# Patient Record
Sex: MALE | Race: BLACK OR AFRICAN AMERICAN | NOT HISPANIC OR LATINO | URBAN - METROPOLITAN AREA
[De-identification: names, ages, dates, MRNs, and addresses within clinical notes are randomized per-mention and may not be internally consistent; named-entity substitution may affect disease eponyms.]

---

## 2021-07-08 ENCOUNTER — EMERGENCY (EMERGENCY)
Facility: HOSPITAL | Age: 26
LOS: 1 days | Discharge: HOME | End: 2021-07-08
Admitting: EMERGENCY MEDICINE
Payer: SELF-PAY

## 2021-07-08 VITALS
OXYGEN SATURATION: 98 % | DIASTOLIC BLOOD PRESSURE: 86 MMHG | HEART RATE: 107 BPM | SYSTOLIC BLOOD PRESSURE: 133 MMHG | WEIGHT: 139.99 LBS | HEIGHT: 65 IN | RESPIRATION RATE: 18 BRPM | TEMPERATURE: 98 F

## 2021-07-08 VITALS
DIASTOLIC BLOOD PRESSURE: 78 MMHG | SYSTOLIC BLOOD PRESSURE: 116 MMHG | TEMPERATURE: 98 F | RESPIRATION RATE: 18 BRPM | OXYGEN SATURATION: 98 % | HEART RATE: 90 BPM

## 2021-07-08 DIAGNOSIS — Z00.00 ENCOUNTER FOR GENERAL ADULT MEDICAL EXAMINATION WITHOUT ABNORMAL FINDINGS: ICD-10-CM

## 2021-07-08 DIAGNOSIS — E86.0 DEHYDRATION: ICD-10-CM

## 2021-07-08 DIAGNOSIS — R53.1 WEAKNESS: ICD-10-CM

## 2021-07-08 DIAGNOSIS — Z59.0 HOMELESSNESS: ICD-10-CM

## 2021-07-08 PROCEDURE — 99284 EMERGENCY DEPT VISIT MOD MDM: CPT

## 2021-07-08 SDOH — ECONOMIC STABILITY - HOUSING INSECURITY: HOMELESSNESS: Z59.0

## 2021-07-08 NOTE — ED PROVIDER NOTE - PATIENT PORTAL LINK FT
You can access the FollowMyHealth Patient Portal offered by [facility] by registering at the following website: [email or web address]. By joining [employer]’s FollowMyHealth portal, you will also be able to view your health information using other applications (apps) compatible with our system.

## 2021-07-08 NOTE — ED PROVIDER NOTE - CLINICAL SUMMARY MEDICAL DECISION MAKING FREE TEXT BOX
Homeless Patient requesting food. Will give food and discharge. Patient has no medical complaints at this time. Stable for discharge.

## 2021-07-08 NOTE — ED ADULT TRIAGE NOTE - CHIEF COMPLAINT QUOTE
Pt BIBA from street. C/o feeling "weak and dehydrated". Ambulatory int triage with steady gait. A&Ox4.

## 2021-07-08 NOTE — ED PROVIDER NOTE - OBJECTIVE STATEMENT
26 y/o homeless male, BIBA from street, c/o feeling "weak and dehydrated". Patient is requesting food. No other medical complaints at this time. Denies fever, chills, chest pain, SOB, abdominal pain, headache, or dizziness.

## 2023-12-14 NOTE — ED PROVIDER NOTE - CROS ED NEURO ALL NEG
"Chief Complaint  [facility] and Hip Pain        Chris PAIGE Thomas Jr. presents to [facility] INTERNAL MEDICINE        Subjective      45 year old male patient presents to [facility]. Switching from [facility].    With complaints of right hip pain today. Has been seeing Dr. Rishi Olmedo for hip pain, has been receiving large joint arthrocentesis - has had three injections. Has been with brief relief but nothing long term.  Next appt is not until January. Is in need of total hip replacement but opted for injections first. Severe degenerative changes right hip and avascular necrosis. He is with difficulty walking and recurrent pain.  Takes ibuprofen 800 mg TID. Pain is sharp and radiating down the leg.     Hip pain pain is affecting ability to maintain erection.reports pain is sharp and distracting.  Reports a history of ED in past and low testosterone level as well. Was on Androgel and sildenafil treatment in the past.    He is obese.  Weight is 255 with a BMI of 39.93.  He has been seeing a dietitian and has been making dietary changes but unable to exercise due to hip pain.     BP elevated today 143/90. He is without headache, visual changes, CP.                     Objective         Vital Signs:     /90 (BP Location: Right arm, Patient Position: Sitting, Cuff Size: Adult)   Pulse 76   Temp 98.1 °F (36.7 °C) (Infrared)   Ht 170.2 cm (67.01\")   Wt 116 kg (255 lb)   SpO2 98%   BMI 39.93 kg/m²       Physical Exam  Vitals reviewed.   Constitutional:       Appearance: He is well-developed. He is obese.      Comments:      HENT:      Head: Normocephalic and atraumatic.      Mouth/Throat:      Mouth: Mucous membranes are moist.      Pharynx: Oropharynx is clear.   Eyes:      Conjunctiva/sclera: Conjunctivae normal.      Pupils: Pupils are equal, round, and reactive to light.   Cardiovascular:      Rate and Rhythm: Normal rate and regular rhythm.      Pulses: Normal pulses.      " Heart sounds: Normal heart sounds.   Pulmonary:      Effort: Pulmonary effort is normal. No respiratory distress.      Breath sounds: Normal breath sounds. No stridor. No wheezing, rhonchi or rales.   Chest:      Chest wall: No tenderness.   Musculoskeletal:      Cervical back: Normal range of motion.      Right hip: Crepitus present. Decreased range of motion.   Skin:     General: Skin is warm and dry.      Findings: No rash.   Neurological:      Mental Status: He is alert and oriented to person, place, and time.   Psychiatric:         Behavior: Behavior normal.                History of Present Illness      Patient Active Problem List   Diagnosis    Essential hypertension    Chronic back pain    Obesity (BMI 30-39.9)    Erectile dysfunction    Vitamin D deficiency    Environmental allergies    Primary osteoarthritis of left knee    Dyslipidemia    Elevated TSH    Avascular necrosis of bone of hip, right    Class 2 obesity without serious comorbidity with body mass index (BMI) of 39.0 to 39.9 in adult    Right hip pain         Past Medical History:   Diagnosis Date    Environmental allergies     tested in youth, no prior immunotherapy    Erectile dysfunction     Fracture, clavicle 1984    Broken collar bone as a child    Hip arthrosis 10/2022    This is the reason for my visit    Hypertension     Low back pain 1/1/1998    Back injury.  Sporadic pain from excessive activity    Low back strain 1995    Work injury - [employer]    Obesity 1/1/2000    SARAH (obstructive sleep apnea)     resolved after uvulectomy and T & A in 2005    Osgood-Schlatter's disease 1994    Never officially diagnosed. But was very symptomatic    Primary osteoarthritis of left knee     Vitamin D deficiency           Family History   Problem Relation Age of Onset    Colon cancer Father         dx'd at 62    Hypertension Father     Cancer Father         Colon cancer    Hyperlipidemia Father     Alcohol abuse Maternal Uncle     Stroke Maternal Grandfather      Hypertension Mother     Hyperlipidemia Mother     Hypertension Sister     Diabetes Maternal Grandmother     Arthritis Maternal Grandmother     Stroke Paternal Grandfather     Heart disease Paternal Grandfather     No Known Problems Son     COPD Paternal Grandmother     Lung disease Neg Hx           Past Surgical History:   Procedure Laterality Date    ADENOIDECTOMY  2004    TONSILLECTOMY AND ADENOIDECTOMY  2005    Dr. Martinez, done for sleep apnea    UVULECTOMY  2005    for SARAH          Social History     Socioeconomic History    Marital status:    Tobacco Use    Smoking status: Never    Smokeless tobacco: Never    Tobacco comments:     Never smoked or used tobacco of any kind   Vaping Use    Vaping Use: Never used   Substance and Sexual Activity    Alcohol use: Not Currently     Comment: rare occassion    Drug use: No    Sexual activity: Yes     Partners: Female     Birth control/protection: Coitus interruptus, None     Comment: Galina Castañeda                    Result Review :                                                  Assessment and Plan      Diagnoses and all orders for this visit:    1. Avascular necrosis of bone of hip, right (Primary)  -     predniSONE (DELTASONE) 20 MG tablet; Take 1 tablet by mouth 2 (Two) Times a Day for 2 days, THEN 1 tablet Daily for 3 days, THEN 0.5 tablets Daily for 4 days.  Dispense: 9 tablet; Refill: 0  -     traMADol-acetaminophen (Ultracet) 37.5-325 MG per tablet; Take 1 tablet by mouth Every 8 (Eight) Hours As Needed for Moderate Pain.  Dispense: 60 tablet; Refill: 0  -     ibuprofen (ADVIL,MOTRIN) 800 MG tablet; Take 1 tablet by mouth Every 8 (Eight) Hours As Needed for Mild Pain.  Dispense: 90 tablet; Refill: 1    2. Right hip pain  -     predniSONE (DELTASONE) 20 MG tablet; Take 1 tablet by mouth 2 (Two) Times a Day for 2 days, THEN 1 tablet Daily for 3 days, THEN 0.5 tablets Daily for 4 days.  Dispense: 9 tablet; Refill: 0  -     traMADol-acetaminophen (Ultracet)  37.5-325 MG per tablet; Take 1 tablet by mouth Every 8 (Eight) Hours As Needed for Moderate Pain.  Dispense: 60 tablet; Refill: 0  -     ibuprofen (ADVIL,MOTRIN) 800 MG tablet; Take 1 tablet by mouth Every 8 (Eight) Hours As Needed for Mild Pain.  Dispense: 90 tablet; Refill: 1    3. Obesity (BMI 30-39.9)  -     T4  -     T3, Free  -     TSH  -     Hemoglobin A1c  -     Comprehensive Metabolic Panel  -     CBC (No Diff)  -     Semaglutide,0.25 or 0.5MG/DOS, (OZEMPIC) 2 MG/3ML solution pen-injector; Inject 0.25 mg under the skin into the appropriate area as directed 1 (One) Time Per Week.  Dispense: 3 mL; Refill: 0    4. Class 2 obesity without serious comorbidity with body mass index (BMI) of 39.0 to 39.9 in adult, unspecified obesity type  -     Hemoglobin A1c  -     Semaglutide,0.25 or 0.5MG/DOS, (OZEMPIC) 2 MG/3ML solution pen-injector; Inject 0.25 mg under the skin into the appropriate area as directed 1 (One) Time Per Week.  Dispense: 3 mL; Refill: 0    5. Elevated TSH  -     T4  -     T3, Free  -     TSH  -     Comprehensive Metabolic Panel  -     CBC (No Diff)    6. Erectile dysfunction, unspecified erectile dysfunction type  -     Testosterone, Free, Total  -     sildenafil (Viagra) 50 MG tablet; Take 1 tablet by mouth Daily As Needed for Erectile Dysfunction.  Dispense: 10 tablet; Refill: 0        Patient newly establishing with me.  Several concerns today.  Regarding the hip pain likely will not get much benefit from ibuprofen or Ultracet as he is in need of a total hip replacement.  Will also place on a steroid pack taper in hopes that this will help with some inflammation    Patient will likely need to lose weight prior to hip surgery and although he is making dietary changes he is unable to exercise due to pain stemming from severe avascular necrosis of the right hip pain.  He is an excellent candidate for semaglutide.  Will make sure he has no prediabetes.  Labs are being drawn today to check  testosterone level.  He has previously had an elevated TSH.  Back but never received treatment.  Ensure that no other underlying conditions are present which could hinder weight loss or cause weight gain.          I spent 38 minutes caring for Chris on this date of service. This time includes time spent by me in the following activities:preparing for the visit, reviewing tests, obtaining and/or reviewing a separately obtained history, performing a medically appropriate examination and/or evaluation , counseling and educating the patient/family/caregiver, ordering medications, tests, or procedures, and documenting information in the medical record                Follow Up       No follow-ups on file.      Patient was given instructions and counseling regarding his condition or for health maintenance advice. Please see specific information pulled into the AVS if appropriate.     Shelia Cummings, APRN12/14/202315:[id number] EST  This note has been electronically signed       negative...